# Patient Record
Sex: FEMALE | Race: WHITE
[De-identification: names, ages, dates, MRNs, and addresses within clinical notes are randomized per-mention and may not be internally consistent; named-entity substitution may affect disease eponyms.]

---

## 2021-02-17 ENCOUNTER — HOSPITAL ENCOUNTER (OUTPATIENT)
Dept: HOSPITAL 35 - LAB | Age: 31
End: 2021-02-17
Attending: ORTHOPAEDIC SURGERY
Payer: COMMERCIAL

## 2021-02-17 DIAGNOSIS — Z20.822: ICD-10-CM

## 2021-02-17 DIAGNOSIS — Z01.812: Primary | ICD-10-CM

## 2021-02-22 ENCOUNTER — HOSPITAL ENCOUNTER (OUTPATIENT)
Dept: HOSPITAL 35 - OR | Age: 31
Discharge: HOME | End: 2021-02-22
Attending: ORTHOPAEDIC SURGERY
Payer: COMMERCIAL

## 2021-02-22 VITALS — SYSTOLIC BLOOD PRESSURE: 99 MMHG | DIASTOLIC BLOOD PRESSURE: 67 MMHG

## 2021-02-22 VITALS — WEIGHT: 119 LBS | BODY MASS INDEX: 21.9 KG/M2 | HEIGHT: 62 IN

## 2021-02-22 DIAGNOSIS — M25.852: ICD-10-CM
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
## 2021-02-22 DIAGNOSIS — Z79.899: ICD-10-CM

## 2021-02-22 DIAGNOSIS — Z98.890: ICD-10-CM
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
## 2021-02-22 DIAGNOSIS — S73.102A: ICD-10-CM

## 2021-02-22 DIAGNOSIS — M25.552: Primary | ICD-10-CM
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
## 2021-02-22 PROCEDURE — 50386 REMOVE STENT VIA TRANSURETH: CPT

## 2021-02-22 PROCEDURE — 56527: CPT

## 2021-02-22 PROCEDURE — 58273: CPT

## 2021-02-22 PROCEDURE — 58274: CPT

## 2021-02-22 PROCEDURE — 51538: CPT

## 2021-02-22 PROCEDURE — 58562 HYSTEROSCOPY REMOVE FB: CPT

## 2021-02-22 PROCEDURE — 58558 HYSTEROSCOPY BIOPSY: CPT

## 2021-02-22 PROCEDURE — 50010 RENAL EXPLORATION: CPT

## 2021-02-22 PROCEDURE — 51320: CPT

## 2021-02-22 PROCEDURE — 57092: CPT

## 2021-02-22 PROCEDURE — 58559 HYSTEROSCOPY LYSIS: CPT

## 2021-02-22 PROCEDURE — 58560 HYSTEROSCOPY RESECT SEPTUM: CPT

## 2021-02-22 PROCEDURE — 70005: CPT

## 2021-02-22 PROCEDURE — 52304: CPT

## 2021-02-22 PROCEDURE — 57103: CPT

## 2021-02-22 PROCEDURE — 56524: CPT

## 2021-02-22 PROCEDURE — 52313: CPT

## 2021-02-22 PROCEDURE — 58563 HYSTEROSCOPY ABLATION: CPT

## 2021-02-22 PROCEDURE — 50101: CPT

## 2021-02-22 PROCEDURE — 58561 HYSTEROSCOPY REMOVE MYOMA: CPT

## 2021-02-22 PROCEDURE — 58557: CPT

## 2021-02-22 PROCEDURE — 52001 CYSTO W/IRRG&EVAC MLT CLOTS: CPT

## 2021-02-22 PROCEDURE — 62900: CPT

## 2021-02-22 PROCEDURE — 58564: CPT

## 2021-02-22 PROCEDURE — 52282 CYSTOSCOPY IMPLANT STENT: CPT

## 2021-02-22 PROCEDURE — 62110: CPT

## 2021-02-23 NOTE — O
[address]   [address]                     OPERATIVE REPORT              
_______________________________________________________________________________
 
Name:       BK STEPHENSON          [facility] #[facility]                     [facility]      [id number]                       Account #:      [id number]  
Admission:  02/22/21    Attend Phys:    Franky Parker MD 
Discharge:  02/22/21    Date of Birth:  [birth year]  
                                                          Report #: [id number]
                                                                    [id number]   
_______________________________________________________________________________
THIS REPORT FOR:  
 
cc:  FAM - No family physician/PCP 
     FAM - No family physician/PCP                                        
     Franky Parker MD                                         ~
 
DATE OF SERVICE:  02/22/2021
 
 
SERVICE:  Orthopedics.
 
FACILITY:  [facility] [place] [facility].
 
SURGEON:  Franky Parker MD
 
ASSISTANT:  Sandy Dash NP.
 
INDICATION FOR ASSISTANT:  Extremity positioning, suture management, arthroscope
management, assistance with repair.
 
PREOPERATIVE DIAGNOSES:
1.  Left hip pain.
2.  Left hip femoroacetabular impingement.
3.  Left hip labral tear.
 
POSTOPERATIVE DIAGNOSES:
1.  Left hip pain.
2.  Left hip femoroacetabular impingement.
3.  Left hip labral tear.
 
PROCEDURES:
1.  Left hip arthroscopic labral repair.
2.  Left hip arthroscopic Cam osteochondroplasty.
3.  Left hip arthroscopic subspine decompression.
 
COMPLICATIONS:  None.
 
DRAINS:  None.
 
SPECIMENS:  None.
 
ANESTHESIA:  General.
 
FINDINGS:
1.  Labral repair with [place] CinchLock suture anchor x 3.
2.  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
 
 
 
[address]   [address]                     OPERATIVE REPORT              
_______________________________________________________________________________
 
Name:       BK STEPHENSON          Room #:                     DEP [facility]#:      [id number]                       Account #:      [id number]  
Admission:  02/22/21    Attend Phys:    Franky Parker MD 
Discharge:  02/22/21    Date of Birth:  [birth year]  
                                                          Report #: [id number]
                                                                    [id number]   
_______________________________________________________________________________
 
3.  Extraarticular impingement secondary to a prominent anterior inferior iliac
spine causing subspine impingement treated with subspine decompression.
4.  Capsular repair, #2 Vicryl x 4.
 
HISTORY:  The patient is a young lady with a history of many years of persistent
progressive bilateral hip pain, LEFT  more severely symptomatic more recently. 
She has been actively treated for well over 6 months conservatively including
rest, activity modification, physical therapy, and oral medicines modalities. 
She had positive pain response with intra-articular injection, but did not have
sustained relief with any of her treatment.  She had preoperative physical
examination and imaging consistent with symptomatic femoroacetabular
impingement, decreased internal rotation, pain affecting activities of daily
living and impingement sign.  Imaging showed significant Cam deformity with an
alpha angle approximately 58 degrees with dense bone at the femoral head and
neck junction.  She had a small crossover sign secondary to a prominent anterior
inferior iliac spine.  MRI showed a partial thickness anterior labral tear.  She
was indicated for surgical treatment.  Risks, benefits, alternatives, and
indication of surgery discussed with her in detail.  Risks include but not
limited to pain, bleeding, infection, injury to nerves or blood vessels,
persistent pain despite surgical intervention, failure of any repairs,
progression of preexisting chondral injury, stiffness, need for further surgery
as well as complications related to anesthesia such as stroke, heart attack,
pulmonary complications, thromboembolic disease and death.  Despite the risks
and she wished to proceed.
 
PROCEDURE IN DETAIL:  After left lower extremity was correctly identified as the
operative extremity, the patient was taken to the operating room where general
anesthesia was induced without complication.  She was padded appropriately. 
Prophylactic antibiotics were administered at appropriate time.  The C-arm was
used to assess the femoral head and neck junction and map out her anatomy and
the left hip was prepped and draped in standard sterile fashion.  Time-out
procedure was performed.  Traction was applied, standard anterolateral viewing
portal was established under fluoroscopy and anteromedial working portal under
fluoroscopic and arthroscopic visualization, then a transverse capsulotomy was
performed.  There was erythema of the capsule, which will be the indication for
continuous passive motion machine usage postoperatively in order to reduce the
risk of scarring and adhesions as these can be reasons for reoperation in this
patient population.  A limited synovectomy was then performed with the shaver. 
The acetabular side demonstrated a partial thickness tear of the acetabular
labrum anteromedially and then there was a chondral wave sign working more
laterally.  The capsule was reflected off the dorsal side of the labrum allowing
access to the acetabular rim and attention was turned towards the subspine
region.  First, an additional capsular dissection was required with the shaver
and the cautery to isolate the subspine region and then the bur was used to
perform a subspine decompression using direct arthroscopic visualization as well
 
 
 
[address]   [address]                     OPERATIVE REPORT              
_______________________________________________________________________________
 
Name:       BK STEPHENSON VINICIUS          Room #:                     DEP [facility] 
JUMA#:      [id number]                       Account #:      [id number]  
Admission:  02/22/21    Attend Phys:    Franky Parker MD 
Discharge:  02/22/21    Date of Birth:  [birth year]  
                                                          Report #: [id number]
                                                                    [id number]   
_______________________________________________________________________________
 
as fluoroscopic visualization to ensure adequate resection.  After this was
completed, the bur was then used to gently decorticate the acetabular rim to
create a fresh bleeding surface for labral repair and refixation and then the
labrum was repaired with [place] CinchLock suture anchor x 3 utilizing cerclage
sutures, the first 2 were placed through the anteromedial portal and then from
the third anchor, we switched to a working lateral portal and the scope
anteromedially and then placed a third anchor more peripherally.  The chondral
labral junction was then probed at this point, it was found quite stable. 
Traction was let down.  Hip was flexed up.  Attention was turned towards the
peripheral compartment.  Transverse capsulotomy was then extended down the neck
allowing access to the Cam deformity.  The bur was then used to perform a Cam
osteoplasty in standard fashion.  I removed the instruments, brought C-arm in,
assessed the resection, identified some additional bone distally that needed to
be resected in order to complete the contouring and then placed the instruments
back in the hip, completed the Cam osteoplasty in a typical fashion and then
removed the instruments, took final x-rays to confirm adequate resection and
then placed the instruments back in the hip, lavaged the bony debris out of the
hip and then proceeded with capsular repair.  A total of four #2 Vicryl sutures
were used to close the T-shaped capsulotomy and then the instruments were
removed.  Portal sites were closed.  Sterile dressing was applied.  The patient
was awakened from anesthesia and taken to recovery room in stable condition.  No
complications.  All counts were correct.
 
 
 
 
 
 
 
 
 
 
 
 
 
 
 
 
 
 
 
 
 
  <ELECTRONICALLY SIGNED>
   By: Franky Parker MD         
  02/23/21     0733
D: 02/22/21 1838                           _____________________________________
T: 02/22/21 1905                           Franky Parker MD           /nt
Labs

## 2021-04-14 ENCOUNTER — HOSPITAL ENCOUNTER (OUTPATIENT)
Dept: HOSPITAL 35 - LAB | Age: 31
End: 2021-04-14
Attending: ORTHOPAEDIC SURGERY
Payer: COMMERCIAL

## 2021-04-14 DIAGNOSIS — Z01.812: Primary | ICD-10-CM

## 2021-04-14 DIAGNOSIS — Z20.822: ICD-10-CM

## 2021-04-19 ENCOUNTER — HOSPITAL ENCOUNTER (OUTPATIENT)
Dept: HOSPITAL 35 - OR | Age: 31
Discharge: HOME | End: 2021-04-19
Attending: ORTHOPAEDIC SURGERY
Payer: COMMERCIAL

## 2021-04-19 VITALS — HEIGHT: 62 IN | BODY MASS INDEX: 21.16 KG/M2 | WEIGHT: 115 LBS

## 2021-04-19 VITALS — DIASTOLIC BLOOD PRESSURE: 72 MMHG | SYSTOLIC BLOOD PRESSURE: 102 MMHG

## 2021-04-19 VITALS — SYSTOLIC BLOOD PRESSURE: 102 MMHG | DIASTOLIC BLOOD PRESSURE: 72 MMHG

## 2021-04-19 DIAGNOSIS — S79.911A: ICD-10-CM

## 2021-04-19 DIAGNOSIS — Z98.890: ICD-10-CM
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
## 2021-04-19 DIAGNOSIS — M25.551: Primary | ICD-10-CM
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
## 2021-04-19 DIAGNOSIS — S73.101A: ICD-10-CM
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
## 2021-04-19 DIAGNOSIS — M25.851: ICD-10-CM
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
## 2021-04-19 PROCEDURE — 58558 HYSTEROSCOPY BIOPSY: CPT

## 2021-04-19 PROCEDURE — 57092: CPT

## 2021-04-19 PROCEDURE — 62900: CPT

## 2021-04-19 PROCEDURE — 50386 REMOVE STENT VIA TRANSURETH: CPT

## 2021-04-19 PROCEDURE — 51538: CPT

## 2021-04-19 PROCEDURE — 56527: CPT

## 2021-04-19 PROCEDURE — 58564: CPT

## 2021-04-19 PROCEDURE — 58608: CPT

## 2021-04-19 PROCEDURE — 50010 RENAL EXPLORATION: CPT

## 2021-04-19 PROCEDURE — 56524: CPT

## 2021-04-19 PROCEDURE — 52282 CYSTOSCOPY IMPLANT STENT: CPT

## 2021-04-19 PROCEDURE — 50101: CPT

## 2021-04-19 PROCEDURE — 62110: CPT

## 2021-04-19 PROCEDURE — 58563 HYSTEROSCOPY ABLATION: CPT

## 2021-04-19 PROCEDURE — 58560 HYSTEROSCOPY RESECT SEPTUM: CPT

## 2021-04-19 PROCEDURE — 52304: CPT

## 2021-04-19 PROCEDURE — 51320: CPT

## 2021-04-19 PROCEDURE — 58273: CPT

## 2021-04-19 PROCEDURE — 58274: CPT

## 2021-04-19 PROCEDURE — 52313: CPT

## 2021-04-19 PROCEDURE — 58559 HYSTEROSCOPY LYSIS: CPT

## 2021-04-19 PROCEDURE — 58561 HYSTEROSCOPY REMOVE MYOMA: CPT

## 2021-04-19 PROCEDURE — 57103: CPT

## 2021-04-19 PROCEDURE — 52001 CYSTO W/IRRG&EVAC MLT CLOTS: CPT

## 2021-04-19 PROCEDURE — 70005: CPT

## 2021-04-19 PROCEDURE — 58562 HYSTEROSCOPY REMOVE FB: CPT

## 2021-04-19 NOTE — O
[address]   [address]                     OPERATIVE REPORT              
_______________________________________________________________________________
 
Name:       BK STEPHENSON          Room #:                     REG [facility]#:      [id number]                       Account #:      [id number]  
Admission:  04/19/21    Attend Phys:    Franky Parker MD 
Discharge:              Date of Birth:  [birth year]  
                                                          Report #: [id number]
                                                                    [id number]   
_______________________________________________________________________________
THIS REPORT FOR:  
 
cc:  FAM - No family physician/PCP 
     FAM - No family physician/PCP                                        
     Franky Parker MD                                         ~
 
DATE OF SERVICE:  04/19/2021
 
 
SERVICE:  Orthopedics.
 
FACILITY:  [place].
 
SURGEON:  Franky Parker MD
 
ASSISTANT:  Sandy Dash NP.
 
INDICATION FOR ASSISTANT:  Extremity positioning, suture management, arthroscope
management, assistance with repair.
 
PREOPERATIVE DIAGNOSES:
1.  Right hip pain.
2.  Right hip femoroacetabular impingement.
3.  Right hip labral tear.
4.  Right hip articular cartilage injury.
 
POSTOPERATIVE DIAGNOSES:
1.  Right hip pain.
2.  Right hip femoroacetabular impingement.
3.  Right hip labral tear.
4.  Right hip articular cartilage injury.
 
PROCEDURES:
1.  Right hip arthroscopic labral repair.
2.  Right hip arthroscopic Cam osteochondroplasty.
3.  Right hip arthroscopic subspine decompression.
4.  Right hip arthroscopic limited chondroplasty.
 
COMPLICATIONS:  None.
 
DRAINS:  None.
 
SPECIMENS:  None.
 
ANESTHESIA:  General.
 
 
 
 
[address]   [address]                     OPERATIVE REPORT              
_______________________________________________________________________________
 
Name:       BK STEPHENSON          Room #:                     REG [facility] 
PUNEET#:      [id number]                       Account #:      [id number]  
Admission:  04/19/21    Attend Phys:    Franky Parker MD 
Discharge:              Date of Birth:  [birth year]  
                                                          Report #: [id number]
                                                                    [id number]   
_______________________________________________________________________________
 
ESTIMATED BLOOD LOSS:  5 mL.
 
FINDINGS:
1.  [place] CinchLock suture anchor x 2 for full-thickness anterior labral tear.
2.  Prominent anterior inferior iliac spine causing extraarticular subspine
impingement requiring additional capsular dissection around the base of the
anterior inferior iliac spine with the cautery and the shaver as well as bony
work to perform the decompression.
3.  Moderate Cam deformity with maximum alpha angle of approximately 60 degrees,
treated for Cam deformity.
4.  Capsular repair with #2 Vicryl x 4.
 
HISTORY:  The patient is a young lady with a history of bilateral
femoroacetabular impingement who had associated labral tears bilaterally.  She
underwent successful left hip arthroscopy earlier this year, had similar
symptoms in the right hip and wished to have definitive treatment on this side
as well.  She had symptoms for greater than 6 months that were actively treated
during this time including rest, activity modifications, physical therapy, and
oral medicines.  She had only temporary and incomplete relief with
intra-articular injection and had physical examination consistent with
impingement with decreased internal rotation and had giveway symptoms on history
as well as pain that was severe enough to affect activities of daily living and
was impacting her ability to perform her job.  Her preoperative imaging was
consistent with combined-type femoroacetabular impingement with an alpha angle
of approximately 60 degrees as well as a prominent anterior-inferior iliac spine
attributable to subspine impingement, which was noted as a crossover sign on the
AP pelvis x-ray.  She had an MRI, which showed a full-thickness anterior labral
tear and had a focal articular cartilage injury on the MRI as well in the
acetabulum.  We had discussion about treatment options including postoperative
considerations, especially in the context of articular cartilage pathology and
she wished to move forward with definitive surgical treatment.  The risks,
benefits, alternatives, and indication of surgery were discussed with her in
detail.  Risks include, but are not limited to pain, bleeding, infection, injury
to nerves or blood vessels, persistent pain despite surgical intervention,
failure of any repairs, progression of preexisting chondral injury, stiffness,
need for further surgery as well as complications related to anesthesia. 
Despite these risks, she wished to proceed.
 
PROCEDURE IN DETAIL:  After right lower extremity was correctly identified in
the preoperative holding as operative extremity, the patient underwent induction
of general anesthesia with LMA.  She was padded appropriately.  Prophylactic
antibiotics were administered at appropriate time.  C-arm was used to identify
the extent of the Cam deformity on the femoral head and neck junction and mapped
this out accordingly and then the right hip was prepped and draped in standard
sterile fashion.  Timeout procedure performed.  Traction was applied.  Standard
 
 
 
[address]   [address]                     OPERATIVE REPORT              
_______________________________________________________________________________
 
Name:       SACHINBK          Room #:                     REG [facility] 
M.R.#:      [id number]                       Account #:      [id number]  
Admission:  04/19/21    Attend Phys:    Franky Parker MD 
Discharge:              Date of Birth:  [birth year]  
                                                          Report #: [id number]
                                                                    [id number]   
_______________________________________________________________________________
 
anterolateral viewing portal was established followed by anteromedial working
portal.  Diagnostic arthroscopy revealed the above findings.  There was
capsulitis and erythema within the capsule and synovium, and this will be the
indication for continuous passive motion machine usage postoperatively in order
to reduce the risk of scarring and adhesions, as these can be reasons for
reoperation in this patient population.
 
Transverse capsulotomy was performed.  Synovectomy was then performed with the
shaver and the capsule was reflected off the dorsal side of the labrum.  There
was an obvious full-thickness anterior labral tear with some increased
vascularity in this location as well as some granulation tissue.  The shaver and
the cautery were used to dissect the base of the anterior-inferior iliac spine
to identify the subspine impingement lesion and then the bur was used to perform
subspine recession, which was done in addition to standard rim preparation for
labral repair.  The bur was then used to abrade and decorticate the acetabular
rim to create a fresh bleeding surface for labral repair and then Virgilio
CinchLock suture anchor x 2 with cerclage sutures were used to repair the
acetabular labrum and secure fixation was achieved.  The shaver was then used to
perform a limited chondroplasty at the chondral labral junction where the
articular cartilage lesion was.  This was a partial-thickness lesion and was
adjacent to the anterior labral tear; and after this was completed, traction was
let down.  Hip was flexed up.  Attention was turned towards peripheral
compartment.
 
Transverse capsulotomy was extended down the neck in a T fashion allowing access
to the entire Cam deformity and then a bur was used to perform a Cam
osteochondroplasty in standard fashion.  Bony debris was lavaged out of the hip.
 Instruments were removed.  C-arm was brought in.  I assessed the resection.  I
was happy with the appearance of the Cam osteoplasty at this point.  So, the
instruments were placed back into the hip.  Final photographs were taken as well
as final lavage and then the T-shaped capsulotomy was closed with total of four
#2 Vicryl sutures.  Instruments were removed.  Portal sites were closed. 
Sterile dressing was applied.  The patient was awakened from anesthesia and
taken to recovery room in stable condition.  No complications.  All counts were
correct.
 
 
 
 
 
 
 
 
  <ELECTRONICALLY SIGNED>
   By: Franky Parker MD         
  04/19/21 2212
D: 04/19/21 1652                           _____________________________________
T: 04/19/21 1715                           Franky Parker MD           /nt